# Patient Record
Sex: FEMALE | Race: WHITE | Employment: STUDENT | URBAN - METROPOLITAN AREA
[De-identification: names, ages, dates, MRNs, and addresses within clinical notes are randomized per-mention and may not be internally consistent; named-entity substitution may affect disease eponyms.]

---
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
## 2024-04-21 NOTE — PROGRESS NOTES
Patient Name:  Bhargavi Santiago  Patient :  2005  Patient MRN:  [id number]     Primary Oncologist: Juan Benavides MD  Referring Provider: Preeti Clark APRN - NP     Date of Service: 2024      Chief Complaint:    No chief complaint on file.     She came in for follow up visit.    There is no problem list on file for this patient.     HPI:   Bhargavi Santiago is a pleasant 17 year old female patient who was referred for evaluation of thalassemia.  10/2016   WBC 8.0 RBC 6.37H HEMOGLOBIN 11.7 HEMATOCRIT 37.8 MCV 59.3L MCH 18.3L MCHC 30.9L RDW 15.8H   PLATELET 250   ABSOLUTE NEUTR. CNT. 3.84   POIKILOCYTES  1+   POLYCHROMASIA  FEW   ELLIPTOCYTES  FEW     Von Willebrand study in 2016 unremarkable. Platelet function test was negative. Ferritin 16.4. TIBC 410.  Mother has thalassemia. She has regular menstruation and been taking naprosyn 500 mg BID prn for cramping pain from menstruation.    Genetic study confirms beta thalassemia. We reviewed iron studies. She is advised not to use iron supplementation regularly. She reports heavy menstruation. 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     She stated that she has regular menstruation.  She has been taking multivitamin.  She is agreeable to have labs today including iron study.  Advised to call for the test result.  2023 MRI brain: Normal noncontrast MRI evaluation of the brain.  2023 WBC 9. Hg 11.7, MCV 60.1, plat 311. Ferritin 66, improving. TIBC 320.  She has been taking daily folic acid.  She has regular menstruation, 2 days the heavier and lasts for 4 D.    2024 follow up visit.    2024 WBC 6.5, RBC 6.08, Hg 11.7, MCV 60, plat 270.  Ferritin 63, Iron 98, TIBC 301, sat 33.   She is on folic acid. She has heavy menses. FU with gyn.  I recommend to take oral OTC iron QOD.  Repeat labs prior to next OV.    No acute pain.  Denied any nausea, vomiting or diarrhea.  No fever or chills.  No chest pain, shortness of breath or

## 2024-05-07 DIAGNOSIS — D64.9 ANEMIA, UNSPECIFIED TYPE: Primary | ICD-10-CM

## 2024-05-13 ENCOUNTER — HOSPITAL ENCOUNTER (OUTPATIENT)
Dept: INFUSION THERAPY | Age: 19
Discharge: HOME | End: 2024-05-13
Payer: COMMERCIAL

## 2024-05-13 DIAGNOSIS — D64.9 ANEMIA, UNSPECIFIED TYPE: ICD-10-CM

## 2024-05-13 LAB
BASOPHILS ABSOLUTE: 0 K/CU MM
BASOPHILS RELATIVE PERCENT: 0.3 % (ref 0–1)
DIFFERENTIAL TYPE: ABNORMAL
EOSINOPHILS ABSOLUTE: 0.1 K/CU MM
EOSINOPHILS RELATIVE PERCENT: 2.2 % (ref 0–3)
FERRITIN: 63 NG/ML (ref 15–150)
HCT VFR BLD CALC: 36.5 % (ref 37–47)
HEMOGLOBIN: 11.7 GM/DL (ref 12.5–16)
IRON: 98 UG/DL (ref 37–145)
LYMPHOCYTES ABSOLUTE: 1.6 K/CU MM
LYMPHOCYTES RELATIVE PERCENT: 24.6 % (ref 24–44)
MCH RBC QN AUTO: 19.2 PG (ref 27–31)
MCHC RBC AUTO-ENTMCNC: 32.1 % (ref 32–36)
MCV RBC AUTO: 60 FL (ref 78–100)
MONOCYTES ABSOLUTE: 0.6 K/CU MM
MONOCYTES RELATIVE PERCENT: 9.3 % (ref 0–4)
NEUTROPHILS ABSOLUTE: 4.1 K/CU MM
NEUTROPHILS RELATIVE PERCENT: 63.6 % (ref 36–66)
PCT TRANSFERRIN: 33 % (ref 10–44)
PDW BLD-RTO: 18.3 % (ref 11.7–14.9)
PLATELET # BLD: 270 K/CU MM (ref 140–440)
PMV BLD AUTO: ABNORMAL FL (ref 7.5–11.1)
RBC # BLD: 6.08 M/CU MM (ref 4.2–5.4)
TOTAL IRON BINDING CAPACITY: 301 UG/DL (ref 250–450)
UNSATURATED IRON BINDING CAPACITY: 203 UG/DL (ref 110–370)
WBC # BLD: 6.5 K/CU MM (ref 4–10.5)

## 2024-05-13 PROCEDURE — 36415 COLL VENOUS BLD VENIPUNCTURE: CPT

## 2024-05-13 PROCEDURE — 82728 ASSAY OF FERRITIN: CPT

## 2024-05-13 PROCEDURE — 83550 IRON BINDING TEST: CPT

## 2024-05-13 PROCEDURE — 85025 COMPLETE CBC W/AUTO DIFF WBC: CPT

## 2024-05-13 PROCEDURE — 83540 ASSAY OF IRON: CPT

## 2024-05-20 ENCOUNTER — OFFICE VISIT (OUTPATIENT)
Dept: ONCOLOGY | Age: 19
End: 2024-05-20
Payer: COMMERCIAL

## 2024-05-20 ENCOUNTER — HOSPITAL ENCOUNTER (OUTPATIENT)
Dept: INFUSION THERAPY | Age: 19
Discharge: HOME | End: 2024-05-20
Payer: COMMERCIAL

## 2024-05-20 VITALS
OXYGEN SATURATION: 99 % | DIASTOLIC BLOOD PRESSURE: 60 MMHG | HEART RATE: 89 BPM | BODY MASS INDEX: 27.2 KG/M2 | WEIGHT: 147.8 LBS | HEIGHT: 62 IN | TEMPERATURE: 98.3 F | SYSTOLIC BLOOD PRESSURE: 116 MMHG

## 2024-05-20 DIAGNOSIS — D64.9 ANEMIA, UNSPECIFIED TYPE: Primary | ICD-10-CM

## 2024-05-20 PROCEDURE — 99211 OFF/OP EST MAY X REQ PHY/QHP: CPT

## 2024-05-20 PROCEDURE — 99213 OFFICE O/P EST LOW 20 MIN: CPT | Performed by: INTERNAL MEDICINE

## 2024-05-20 ASSESSMENT — PATIENT HEALTH QUESTIONNAIRE - PHQ9
SUM OF ALL RESPONSES TO PHQ QUESTIONS 1-9: 0
SUM OF ALL RESPONSES TO PHQ9 QUESTIONS 1 & 2: 0
SUM OF ALL RESPONSES TO PHQ QUESTIONS 1-9: 0
SUM OF ALL RESPONSES TO PHQ QUESTIONS 1-9: 0
2. FEELING DOWN, DEPRESSED OR HOPELESS: NOT AT ALL
1. LITTLE INTEREST OR PLEASURE IN DOING THINGS: NOT AT ALL
SUM OF ALL RESPONSES TO PHQ QUESTIONS 1-9: 0

## 2024-05-20 NOTE — PROGRESS NOTES
MA Rooming Questions  Patient: Bhargavi Santiago  MRN: [id number]    Date: 5/20/2024        1. Do you have any new issues?   no         2. Do you need any refills on medications?    no    3. Have you had any imaging done since your last visit?   yes - labs 5/13    4. Have you been hospitalized or seen in the emergency room since your last visit here?   no    5. Did the patient have a depression screening completed today? Yes    PHQ-9 Total Score: 0 (5/20/2024  8:55 AM)       PHQ-9 Given to (if applicable):               PHQ-9 Score (if applicable):                     [] Positive     []  Negative              Does question #9 need addressed (if applicable)                     [] Yes    []  No               Jalyn Moura CMA

## 2024-11-10 NOTE — PROGRESS NOTES
Patient Name:  Bhargavi Santiago  Patient :  2005  Patient MRN:  [id number]     Primary Oncologist: Juan Benavides MD  Referring Provider: Preeti Clark APRN - NP     Date of Service: 2024      Chief Complaint:    Chief Complaint   Patient presents with    Follow-up      She came in for follow up visit.    There is no problem list on file for this patient.     HPI:   Bhargavi Santiago is a pleasant 19 year old female patient who was referred for evaluation of thalassemia.  10/2016   WBC 8.0 RBC 6.37H HEMOGLOBIN 11.7 HEMATOCRIT 37.8 MCV 59.3L MCH 18.3L MCHC 30.9L RDW 15.8H   PLATELET 250   ABSOLUTE NEUTR. CNT. 3.84   POIKILOCYTES  1+   POLYCHROMASIA  FEW   ELLIPTOCYTES  FEW     Von Willebrand study in 2016 unremarkable. Platelet function test was negative. Ferritin 16.4. TIBC 410.  Mother has thalassemia. She has regular menstruation and been taking naprosyn 500 mg BID prn for cramping pain from menstruation.    Genetic study confirms beta thalassemia. We reviewed iron studies. She is advised not to use iron supplementation regularly. She reports heavy menstruation. 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     She stated that she has regular menstruation.  She has been taking multivitamin.  She is agreeable to have labs today including iron study.  Advised to call for the test result.  2023 MRI brain: Normal noncontrast MRI evaluation of the brain.  2023 WBC 9. Hg 11.7, MCV 60.1, plat 311. Ferritin 66, improving. TIBC 320.  She has been taking daily folic acid.  She has regular menstruation, 2 days the heavier and lasts for 4 D.  2024 WBC 6.5, RBC 6.08, Hg 11.7, MCV 60, plat 270.  Ferritin 63, Iron 98, TIBC 301, sat 33.   She is on folic acid. She has heavy menses. FU with gyn.  I recommend to take oral OTC iron QOD.    2024 follow up visit  11/15/2024 WBC 10.1, Hg 12, MCV 58.8, plat 275. RBC 6.19H.  Ferritin 65, TIBC 330, sat 27, iron 65.   Recommend limit Naprosyn. On

## 2024-11-15 ENCOUNTER — HOSPITAL ENCOUNTER (OUTPATIENT)
Dept: INFUSION THERAPY | Age: 19
Discharge: HOME | End: 2024-11-15
Payer: COMMERCIAL

## 2024-11-15 DIAGNOSIS — D64.9 ANEMIA, UNSPECIFIED TYPE: ICD-10-CM

## 2024-11-15 LAB
BASOPHILS # BLD: 0.03 K/UL
BASOPHILS NFR BLD: 0 % (ref 0–1)
EOSINOPHIL # BLD: 0.06 K/UL
EOSINOPHILS RELATIVE PERCENT: 1 % (ref 0–3)
ERYTHROCYTE [DISTWIDTH] IN BLOOD BY AUTOMATED COUNT: 17.8 % (ref 11.7–14.9)
FERRITIN SERPL-MCNC: 65 NG/ML (ref 15–150)
HCT VFR BLD AUTO: 36.4 % (ref 37–47)
HGB BLD-MCNC: 12 G/DL (ref 12.5–16)
IRON SATN MFR SERPL: 27 % (ref 15–50)
IRON SERPL-MCNC: 91 UG/DL (ref 37–145)
LYMPHOCYTES NFR BLD: 2.11 K/UL
LYMPHOCYTES RELATIVE PERCENT: 21 % (ref 24–44)
MCH RBC QN AUTO: 19.4 PG (ref 27–31)
MCHC RBC AUTO-ENTMCNC: 33 G/DL (ref 32–36)
MCV RBC AUTO: 58.8 FL (ref 78–100)
MONOCYTES NFR BLD: 0.82 K/UL
MONOCYTES NFR BLD: 8 % (ref 0–4)
NEUTROPHILS NFR BLD: 70 % (ref 36–66)
NEUTS SEG NFR BLD: 7.05 K/UL
PLATELET # BLD AUTO: 275 K/UL (ref 140–440)
RBC # BLD AUTO: 6.19 M/UL (ref 4.2–5.4)
TIBC SERPL-MCNC: 330 UG/DL (ref 260–445)
UNSATURATED IRON BINDING CAPACITY: 239 UG/DL (ref 110–370)
WBC OTHER # BLD: 10.1 K/UL (ref 4–10.5)

## 2024-11-15 PROCEDURE — 85025 COMPLETE CBC W/AUTO DIFF WBC: CPT

## 2024-11-15 PROCEDURE — 83540 ASSAY OF IRON: CPT

## 2024-11-15 PROCEDURE — 82728 ASSAY OF FERRITIN: CPT

## 2024-11-15 PROCEDURE — 36415 COLL VENOUS BLD VENIPUNCTURE: CPT

## 2024-11-15 PROCEDURE — 83550 IRON BINDING TEST: CPT

## 2024-11-18 ENCOUNTER — OFFICE VISIT (OUTPATIENT)
Dept: ONCOLOGY | Age: 19
End: 2024-11-18
Payer: COMMERCIAL

## 2024-11-18 ENCOUNTER — HOSPITAL ENCOUNTER (OUTPATIENT)
Dept: INFUSION THERAPY | Age: 19
Discharge: HOME | End: 2024-11-18
Payer: COMMERCIAL

## 2024-11-18 VITALS
DIASTOLIC BLOOD PRESSURE: 79 MMHG | SYSTOLIC BLOOD PRESSURE: 132 MMHG | HEIGHT: 62 IN | BODY MASS INDEX: 26.65 KG/M2 | TEMPERATURE: 98.7 F | OXYGEN SATURATION: 100 % | WEIGHT: 144.8 LBS | HEART RATE: 71 BPM

## 2024-11-18 DIAGNOSIS — D64.9 ANEMIA, UNSPECIFIED TYPE: Primary | ICD-10-CM

## 2024-11-18 PROCEDURE — 99213 OFFICE O/P EST LOW 20 MIN: CPT | Performed by: INTERNAL MEDICINE

## 2024-11-18 PROCEDURE — 99211 OFF/OP EST MAY X REQ PHY/QHP: CPT

## 2024-11-18 RX ORDER — FREMANEZUMAB-VFRM 225 MG/1.5ML
INJECTION SUBCUTANEOUS
COMMUNITY

## 2024-11-18 ASSESSMENT — PATIENT HEALTH QUESTIONNAIRE - PHQ9
2. FEELING DOWN, DEPRESSED OR HOPELESS: NOT AT ALL
SUM OF ALL RESPONSES TO PHQ QUESTIONS 1-9: 0
1. LITTLE INTEREST OR PLEASURE IN DOING THINGS: NOT AT ALL
SUM OF ALL RESPONSES TO PHQ QUESTIONS 1-9: 0
SUM OF ALL RESPONSES TO PHQ9 QUESTIONS 1 & 2: 0

## 2024-11-18 NOTE — PROGRESS NOTES
MA Rooming Questions  Patient: Bhargavi Santiago  MRN: [id number]    Date: 11/18/2024        1. Do you have any new issues?   no         2. Do you need any refills on medications?    no    3. Have you had any imaging done since your last visit?   no    4. Have you been hospitalized or seen in the emergency room since your last visit here?   no    5. Did the patient have a depression screening completed today? Yes    PHQ-9 Total Score: 0 (11/18/2024  9:15 AM)       PHQ-9 Given to (if applicable):               PHQ-9 Score (if applicable):                     [] Positive     []  Negative              Does question #9 need addressed (if applicable)                     [] Yes    []  No               Karina Shi MA

## 2025-03-21 ENCOUNTER — HOSPITAL ENCOUNTER (OUTPATIENT)
Dept: GENERAL RADIOLOGY | Age: 20
Discharge: HOME | End: 2025-03-21

## 2025-03-21 DIAGNOSIS — Z00.6 EXAMINATION FOR NORMAL COMPARISON OR CONTROL IN CLINICAL RESEARCH: ICD-10-CM

## 2025-04-27 NOTE — PROGRESS NOTES
Patient Name:  Bhargavi Santiago  Patient :  2005  Patient MRN:  [id number]     Primary Oncologist: Juan Benavides MD  Referring Provider: Preeti Clark APRN - NP     Date of Service: 2025      Chief Complaint:    Chief Complaint   Patient presents with    Follow-up      She came in for follow up visit.    There is no problem list on file for this patient.     HPI:   Bhargavi Santiago is a pleasant 19 year old female patient who was referred for evaluation of thalassemia.  10/2016   WBC 8.0 RBC 6.37H HEMOGLOBIN 11.7 HEMATOCRIT 37.8 MCV 59.3L MCH 18.3L MCHC 30.9L RDW 15.8H   PLATELET 250   ABSOLUTE NEUTR. CNT. 3.84   POIKILOCYTES  1+   POLYCHROMASIA  FEW   ELLIPTOCYTES  FEW     2016 Von Willebrand study unremarkable. Platelet function test negative. Ferritin 16.4. TIBC 410.  Mother has thalassemia. She has regular menstruation and been taking naprosyn 500 mg BID prn for cramping pain from menstruation.    Genetic study confirms beta thalassemia. We reviewed iron studies. She is advised not to use iron supplementation regularly. She reports heavy menstruation. 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     She stated that she has regular menstruation.  She has been taking multivitamin.  She is agreeable to have labs today including iron study.  Advised to call for the test result.  2023 MRI brain: Normal noncontrast MRI evaluation of the brain.  2023 WBC 9. Hg 11.7, MCV 60.1, plat 311. Ferritin 66, improving. TIBC 320.  She has been taking daily folic acid.  She has regular menstruation, 2 days the heavier and lasts for 4 D.  2024 WBC 6.5, RBC 6.08, Hg 11.7, MCV 60, plat 270.  Ferritin 63, Iron 98, TIBC 301, sat 33.   She is on folic acid. She has heavy menses. FU with gyn.  I recommend to take oral OTC iron QOD.  11/15/2024 WBC 10.1, Hg 12, MCV 58.8, plat 275. RBC 6.19H.  Ferritin 65, TIBC 330, sat 27, iron 65.   Recommend limit Naprosyn. On folic acid. Recommend to take oral iron

## 2025-05-16 ENCOUNTER — HOSPITAL ENCOUNTER (OUTPATIENT)
Dept: INFUSION THERAPY | Age: 20
Discharge: HOME | End: 2025-05-16
Payer: COMMERCIAL

## 2025-05-16 DIAGNOSIS — D64.9 ANEMIA, UNSPECIFIED TYPE: ICD-10-CM

## 2025-05-16 LAB
BASOPHILS # BLD: 0.03 K/UL
BASOPHILS NFR BLD: 0 % (ref 0–1)
EOSINOPHIL # BLD: 0.1 K/UL
EOSINOPHILS RELATIVE PERCENT: 1 % (ref 0–3)
ERYTHROCYTE [DISTWIDTH] IN BLOOD BY AUTOMATED COUNT: 17.1 % (ref 11.7–14.9)
FERRITIN SERPL-MCNC: 75 NG/ML (ref 15–150)
HCT VFR BLD AUTO: 35.8 % (ref 37–47)
HGB BLD-MCNC: 11.2 G/DL (ref 12.5–16)
IRON SATN MFR SERPL: 24 % (ref 15–50)
IRON SERPL-MCNC: 75 UG/DL (ref 37–145)
LYMPHOCYTES NFR BLD: 1.81 K/UL
LYMPHOCYTES RELATIVE PERCENT: 24 % (ref 24–44)
MCH RBC QN AUTO: 19.2 PG (ref 27–31)
MCHC RBC AUTO-ENTMCNC: 31.3 G/DL (ref 32–36)
MCV RBC AUTO: 61.3 FL (ref 78–100)
MONOCYTES NFR BLD: 0.72 K/UL
MONOCYTES NFR BLD: 10 % (ref 0–5)
NEUTROPHILS NFR BLD: 64 % (ref 36–66)
NEUTS SEG NFR BLD: 4.8 K/UL
PLATELET # BLD AUTO: 116 K/UL (ref 140–440)
RBC # BLD AUTO: 5.84 M/UL (ref 4.2–5.4)
TIBC SERPL-MCNC: 319 UG/DL (ref 260–445)
UNSATURATED IRON BINDING CAPACITY: 244 UG/DL (ref 110–370)
WBC OTHER # BLD: 7.5 K/UL (ref 4–10.5)

## 2025-05-16 PROCEDURE — 82728 ASSAY OF FERRITIN: CPT

## 2025-05-16 PROCEDURE — 85025 COMPLETE CBC W/AUTO DIFF WBC: CPT

## 2025-05-16 PROCEDURE — 36415 COLL VENOUS BLD VENIPUNCTURE: CPT

## 2025-05-16 PROCEDURE — 83540 ASSAY OF IRON: CPT

## 2025-05-16 PROCEDURE — 83550 IRON BINDING TEST: CPT

## 2025-05-23 ENCOUNTER — HOSPITAL ENCOUNTER (OUTPATIENT)
Dept: INFUSION THERAPY | Age: 20
Discharge: HOME | End: 2025-05-23
Payer: COMMERCIAL

## 2025-05-23 ENCOUNTER — OFFICE VISIT (OUTPATIENT)
Dept: ONCOLOGY | Age: 20
End: 2025-05-23
Payer: COMMERCIAL

## 2025-05-23 VITALS
BODY MASS INDEX: 26.54 KG/M2 | HEIGHT: 62 IN | WEIGHT: 144.2 LBS | DIASTOLIC BLOOD PRESSURE: 75 MMHG | HEART RATE: 99 BPM | SYSTOLIC BLOOD PRESSURE: 131 MMHG | TEMPERATURE: 98.7 F | OXYGEN SATURATION: 99 %

## 2025-05-23 DIAGNOSIS — D69.6 THROMBOCYTOPENIA: ICD-10-CM

## 2025-05-23 DIAGNOSIS — D64.9 ANEMIA, UNSPECIFIED TYPE: ICD-10-CM

## 2025-05-23 DIAGNOSIS — D64.9 ANEMIA, UNSPECIFIED TYPE: Primary | ICD-10-CM

## 2025-05-23 LAB
ALBUMIN SERPL-MCNC: 5.1 G/DL (ref 3.4–5)
ALBUMIN/GLOB SERPL: 1.7 {RATIO} (ref 1.1–2.2)
ALP SERPL-CCNC: 72 U/L (ref 40–129)
ALT SERPL-CCNC: 18 U/L (ref 10–40)
ANION GAP SERPL CALCULATED.3IONS-SCNC: 16 MMOL/L (ref 9–17)
AST SERPL-CCNC: 24 U/L (ref 15–37)
BASOPHILS # BLD: 0.03 K/UL
BASOPHILS NFR BLD: 0 % (ref 0–1)
BILIRUB SERPL-MCNC: 0.7 MG/DL (ref 0–1)
BUN SERPL-MCNC: 9 MG/DL (ref 7–20)
CALCIUM SERPL-MCNC: 9.9 MG/DL (ref 8.3–10.6)
CHLORIDE SERPL-SCNC: 102 MMOL/L (ref 99–110)
CO2 SERPL-SCNC: 23 MMOL/L (ref 21–32)
CREAT SERPL-MCNC: 0.6 MG/DL (ref 0.6–1.1)
EOSINOPHIL # BLD: 0.1 K/UL
EOSINOPHILS RELATIVE PERCENT: 1 % (ref 0–3)
ERYTHROCYTE [DISTWIDTH] IN BLOOD BY AUTOMATED COUNT: 18.7 % (ref 11.7–14.9)
GFR, ESTIMATED: >90 ML/MIN/1.73M2
GLUCOSE SERPL-MCNC: 82 MG/DL (ref 74–99)
HCT VFR BLD AUTO: 38.7 % (ref 37–47)
HGB BLD-MCNC: 12.7 G/DL (ref 12.5–16)
LYMPHOCYTES NFR BLD: 1.95 K/UL
LYMPHOCYTES RELATIVE PERCENT: 26 % (ref 24–44)
MCH RBC QN AUTO: 19.4 PG (ref 27–31)
MCHC RBC AUTO-ENTMCNC: 32.8 G/DL (ref 32–36)
MCV RBC AUTO: 59 FL (ref 78–100)
MONOCYTES NFR BLD: 0.61 K/UL
MONOCYTES NFR BLD: 8 % (ref 0–5)
NEUTROPHILS NFR BLD: 64 % (ref 36–66)
NEUTS SEG NFR BLD: 4.85 K/UL
PLATELET # BLD AUTO: 238 K/UL (ref 140–440)
POTASSIUM SERPL-SCNC: 3.5 MMOL/L (ref 3.5–5.1)
PROT SERPL-MCNC: 8.1 G/DL (ref 6.4–8.2)
RBC # BLD AUTO: 6.56 M/UL (ref 4.2–5.4)
SODIUM SERPL-SCNC: 141 MMOL/L (ref 136–145)
WBC OTHER # BLD: 7.5 K/UL (ref 4–10.5)

## 2025-05-23 PROCEDURE — 80053 COMPREHEN METABOLIC PANEL: CPT

## 2025-05-23 PROCEDURE — 36415 COLL VENOUS BLD VENIPUNCTURE: CPT

## 2025-05-23 PROCEDURE — 99213 OFFICE O/P EST LOW 20 MIN: CPT | Performed by: INTERNAL MEDICINE

## 2025-05-23 PROCEDURE — 85025 COMPLETE CBC W/AUTO DIFF WBC: CPT

## 2025-05-23 PROCEDURE — 99212 OFFICE O/P EST SF 10 MIN: CPT

## 2025-05-23 PROCEDURE — G8427 DOCREV CUR MEDS BY ELIG CLIN: HCPCS | Performed by: INTERNAL MEDICINE

## 2025-05-23 PROCEDURE — 1036F TOBACCO NON-USER: CPT | Performed by: INTERNAL MEDICINE

## 2025-05-23 PROCEDURE — G8419 CALC BMI OUT NRM PARAM NOF/U: HCPCS | Performed by: INTERNAL MEDICINE

## 2025-05-23 PROCEDURE — 80074 ACUTE HEPATITIS PANEL: CPT

## 2025-05-23 RX ORDER — ERENUMAB-AOOE 70 MG/ML
70 INJECTION SUBCUTANEOUS
COMMUNITY

## 2025-05-23 ASSESSMENT — PATIENT HEALTH QUESTIONNAIRE - PHQ9
SUM OF ALL RESPONSES TO PHQ QUESTIONS 1-9: 0
SUM OF ALL RESPONSES TO PHQ QUESTIONS 1-9: 0
1. LITTLE INTEREST OR PLEASURE IN DOING THINGS: NOT AT ALL
SUM OF ALL RESPONSES TO PHQ QUESTIONS 1-9: 0
2. FEELING DOWN, DEPRESSED OR HOPELESS: NOT AT ALL
SUM OF ALL RESPONSES TO PHQ QUESTIONS 1-9: 0

## 2025-05-23 NOTE — PROGRESS NOTES
MA Rooming Questions  Patient: Bhargavi Santiago  MRN: [id number]    Date: 5/23/2025        1. Do you have any new issues?   no         2. Do you need any refills on medications?    no    3. Have you had any imaging done since your last visit?   no    4. Have you been hospitalized or seen in the emergency room since your last visit here?   no    5. Did the patient have a depression screening completed today? Yes    PHQ-9 Total Score: 0 (5/23/2025  1:19 PM)       PHQ-9 Given to (if applicable):               PHQ-9 Score (if applicable):                     [] Positive     []  Negative              Does question #9 need addressed (if applicable)                     [] Yes    []  No               Gayle Galicia MA

## 2025-06-10 ENCOUNTER — CLINICAL SUPPORT (OUTPATIENT)
Dept: FAMILY MEDICINE CLINIC | Age: 20
End: 2025-06-10

## 2025-06-10 DIAGNOSIS — Z11.1 TUBERCULOSIS SCREENING: Primary | ICD-10-CM

## 2025-06-10 ASSESSMENT — PATIENT HEALTH QUESTIONNAIRE - PHQ9
1. LITTLE INTEREST OR PLEASURE IN DOING THINGS: NOT AT ALL
SUM OF ALL RESPONSES TO PHQ QUESTIONS 1-9: 0
2. FEELING DOWN, DEPRESSED OR HOPELESS: NOT AT ALL

## 2025-08-11 RX ORDER — VENLAFAXINE HYDROCHLORIDE 37.5 MG/1
37.5 CAPSULE, EXTENDED RELEASE ORAL DAILY
COMMUNITY

## 2025-08-11 RX ORDER — BUSPIRONE HYDROCHLORIDE 5 MG/1
5 TABLET ORAL 2 TIMES DAILY
COMMUNITY

## 2025-08-11 RX ORDER — CHOLECALCIFEROL (VITAMIN D3) 50 MCG
CAPSULE ORAL
COMMUNITY

## 2025-08-11 ASSESSMENT — LIFESTYLE VARIABLES: HOW OFTEN DO YOU HAVE A DRINK CONTAINING ALCOHOL: NEVER
